# Patient Record
Sex: MALE | Race: BLACK OR AFRICAN AMERICAN | NOT HISPANIC OR LATINO
[De-identification: names, ages, dates, MRNs, and addresses within clinical notes are randomized per-mention and may not be internally consistent; named-entity substitution may affect disease eponyms.]

---

## 2023-03-14 PROBLEM — Z00.00 ENCOUNTER FOR PREVENTIVE HEALTH EXAMINATION: Status: ACTIVE | Noted: 2023-03-14

## 2023-03-16 ENCOUNTER — APPOINTMENT (OUTPATIENT)
Dept: ORTHOPEDIC SURGERY | Facility: CLINIC | Age: 38
End: 2023-03-16

## 2023-04-13 ENCOUNTER — APPOINTMENT (OUTPATIENT)
Dept: ORTHOPEDIC SURGERY | Facility: CLINIC | Age: 38
End: 2023-04-13

## 2023-04-13 ENCOUNTER — RESULT REVIEW (OUTPATIENT)
Age: 38
End: 2023-04-13

## 2023-04-13 ENCOUNTER — OUTPATIENT (OUTPATIENT)
Dept: OUTPATIENT SERVICES | Facility: HOSPITAL | Age: 38
LOS: 1 days | End: 2023-04-13
Payer: MEDICAID

## 2023-04-13 ENCOUNTER — APPOINTMENT (OUTPATIENT)
Dept: ORTHOPEDIC SURGERY | Facility: CLINIC | Age: 38
End: 2023-04-13
Payer: MEDICAID

## 2023-04-13 VITALS
DIASTOLIC BLOOD PRESSURE: 95 MMHG | SYSTOLIC BLOOD PRESSURE: 132 MMHG | HEART RATE: 69 BPM | BODY MASS INDEX: 27.19 KG/M2 | WEIGHT: 235 LBS | OXYGEN SATURATION: 99 % | HEIGHT: 78 IN

## 2023-04-13 DIAGNOSIS — S93.491S SPRAIN OF OTHER LIGAMENT OF RIGHT ANKLE, SEQUELA: ICD-10-CM

## 2023-04-13 DIAGNOSIS — G57.30 LESION OF LATERAL POPLITEAL NERVE, UNSPECIFIED LOWER LIMB: ICD-10-CM

## 2023-04-13 PROCEDURE — 73610 X-RAY EXAM OF ANKLE: CPT | Mod: 26,RT

## 2023-04-13 PROCEDURE — 73610 X-RAY EXAM OF ANKLE: CPT

## 2023-04-13 PROCEDURE — 99203 OFFICE O/P NEW LOW 30 MIN: CPT

## 2023-04-13 RX ORDER — GABAPENTIN 100 MG/1
100 CAPSULE ORAL
Qty: 60 | Refills: 1 | Status: ACTIVE | COMMUNITY
Start: 2023-04-13 | End: 1900-01-01

## 2023-04-15 NOTE — HISTORY OF PRESENT ILLNESS
[de-identified] : LAVELLE JOHNSON is a 38 year old male who presents with right ankle pain.\par States the onset of pain was 18 months ago\par Was hit lateral leg at the time\par Rested and attended PT with no improvement\par Saw physician in [facility] who ordered MRI 6 months ago reported as normal (per patient - result unavailable).\par Had 5 more months of PT \par Pain is lateral distal 1/3 of fibula described as burning, tingling in quality.\par Pain is nonradiating.\par Had swelling first 2 weeks but none since\par There is no associated numbness, paraesthesia or weakness.\par Exacerbating factors are running, jumping\par Has not been able to play soccer for 1 year\par Patient ambulates independently.\par Exercises regularly. \par Has not tried PT. \par Employment:  ([employer])

## 2023-04-15 NOTE — PHYSICAL EXAM
[de-identified] : General: Well-nourished, well-developed, alert, and in no acute distress.\par Head: Normocephalic.\par Eyes: Pupils equal, extraocular muscles intact, normal sclera.\par Nose: No nasal discharge.\par Cardiovascular: Extremities are warm and well perfused. Distal pulses are symmetric bilaterally.\par Respiratory: No labored breathing.\par Extremities: Sensation is intact distally bilaterally. Distal pulses are symmetric bilaterally\par Lymphatic: No regional lymphadenopathy, no lymphedema\par Neurologic: No focal deficits\par Skin: Normal skin color, texture, and turgor\par Psychiatric: Normal affect \par \par Examination of right ankle\par Gait normal, non-antalgic\par Able to toe walk, heel walk\par Ambulating independently\par Inspection: no ecchymosis, effusion\par Tender to palpation: anterolateral distal 1/3 of leg at EDL and peroneus brevis muscles\par Nontender to palpation: medial malleolus, lateral malleolus, base of 5th metatarsal, navicular, ATFL, CFL, PTFL, AITFL, Achilles tendon, PT, FHL, tibialis anterior, plantar fascia\par \par ROM: Plantar flexion 45 deg, dorsiflexion 20 deg, inversion 20 deg, eversion 30 deg\par Special Tests: \par Anterior Drawer (ATFL): negative for pain and laxity\par Talar Tilt (CFL): negative for pain and laxity\par Kleigers (ext rot): negative for pain and laxity at deltoid ligament or distal fibula\par Squeeze test: negative at proximal or distal syndesmosis\par Bump test: negative at talar dome, syndesmosis\par Able to perform single heel raise \par Petit test negative\par \par Sensation is intact to light touch over the superficial and deep peroneal nerve distributions and the posterior tibial nerve distribution. Capillary refill is less than two seconds. Posterior tibial and dorsalis pedis pulses 2+ equal bilaterally. No calf swelling or tenderness bilaterally. Strength testing shows 5/5 Quads, 5/5 Hamstrings, 5/5 EHL, 5/5 FHL, 5/5 tibialis anterior, 5/5 gastroc-soleus complex. \par Reflexes: Patellar 2+, Achilles 2+, Babinski negative  [de-identified] : XR right ankle (4/13/23): There is no evidence of fracture or dislocation.  There is medial talotibial joint space narrowing with possible anterior osteophyte or ligament calcification.

## 2023-04-15 NOTE — ASSESSMENT
[FreeTextEntry1] : LAVELLE JOHNSON is a 38 year old male with right ankle pain.\par I discussed with the patient that their symptoms, signs, and imaging are most consistent with possible injury to superficial peroneal nerve following high lateral ankle sprain.\par We reviewed the natural history of this condition and treatment options.\par We agreed on the following plan:\par \par Xray taken today.\par Start home exercises for peroneal stretching and strengthening. Handout provided.\par Medication: Gabapentin 100mg HS then increase to 300mg as tolerated. Prescription provided.\par Refer for NCS/EMG\par Consider US guided injection if no improvement in symptoms. \par Follow up in 6-8 weeks.

## 2023-05-18 ENCOUNTER — APPOINTMENT (OUTPATIENT)
Dept: ORTHOPEDIC SURGERY | Facility: CLINIC | Age: 38
End: 2023-05-18